# Patient Record
Sex: MALE | Race: WHITE | Employment: FULL TIME | URBAN - METROPOLITAN AREA
[De-identification: names, ages, dates, MRNs, and addresses within clinical notes are randomized per-mention and may not be internally consistent; named-entity substitution may affect disease eponyms.]

---

## 2017-05-18 ENCOUNTER — APPOINTMENT (OUTPATIENT)
Dept: CT IMAGING | Facility: CLINIC | Age: 57
End: 2017-05-18
Attending: EMERGENCY MEDICINE
Payer: COMMERCIAL

## 2017-05-18 ENCOUNTER — APPOINTMENT (OUTPATIENT)
Dept: ULTRASOUND IMAGING | Facility: CLINIC | Age: 57
End: 2017-05-18
Attending: EMERGENCY MEDICINE
Payer: COMMERCIAL

## 2017-05-18 ENCOUNTER — HOSPITAL ENCOUNTER (EMERGENCY)
Facility: CLINIC | Age: 57
Discharge: HOME | End: 2017-05-18
Attending: EMERGENCY MEDICINE | Admitting: EMERGENCY MEDICINE
Payer: COMMERCIAL

## 2017-05-18 VITALS
SYSTOLIC BLOOD PRESSURE: 148 MMHG | TEMPERATURE: 97.8 F | DIASTOLIC BLOOD PRESSURE: 99 MMHG | RESPIRATION RATE: 16 BRPM | OXYGEN SATURATION: 97 % | HEART RATE: 102 BPM

## 2017-05-18 DIAGNOSIS — E87.1 HYPONATREMIA: ICD-10-CM

## 2017-05-18 DIAGNOSIS — K70.10 ALCOHOLIC HEPATITIS WITHOUT ASCITES (H): ICD-10-CM

## 2017-05-18 DIAGNOSIS — R17 JAUNDICE: ICD-10-CM

## 2017-05-18 LAB
ALBUMIN SERPL-MCNC: 3 G/DL (ref 3.4–5)
ALBUMIN UR-MCNC: NEGATIVE MG/DL
ALP SERPL-CCNC: 567 U/L (ref 40–150)
ALT SERPL W P-5'-P-CCNC: 235 U/L (ref 0–70)
AMMONIA PLAS-SCNC: <10 UMOL/L (ref 10–50)
ANION GAP SERPL CALCULATED.3IONS-SCNC: 9 MMOL/L (ref 3–14)
APAP SERPL-MCNC: NORMAL MG/L (ref 10–20)
APPEARANCE UR: ABNORMAL
APTT PPP: 30 SEC (ref 22–37)
AST SERPL W P-5'-P-CCNC: 515 U/L (ref 0–45)
BASOPHILS # BLD AUTO: 0.1 10E9/L (ref 0–0.2)
BASOPHILS NFR BLD AUTO: 1.2 %
BILIRUB SERPL-MCNC: 9.1 MG/DL (ref 0.2–1.3)
BILIRUB UR QL STRIP: ABNORMAL
BUN SERPL-MCNC: 8 MG/DL (ref 7–30)
CALCIUM SERPL-MCNC: 9.3 MG/DL (ref 8.5–10.1)
CHLORIDE SERPL-SCNC: 91 MMOL/L (ref 94–109)
CO2 SERPL-SCNC: 28 MMOL/L (ref 20–32)
COLOR UR AUTO: ABNORMAL
CREAT SERPL-MCNC: 0.75 MG/DL (ref 0.66–1.25)
DIFFERENTIAL METHOD BLD: ABNORMAL
EOSINOPHIL # BLD AUTO: 0 10E9/L (ref 0–0.7)
EOSINOPHIL NFR BLD AUTO: 0.2 %
ERYTHROCYTE [DISTWIDTH] IN BLOOD BY AUTOMATED COUNT: 17.1 % (ref 10–15)
ETHANOL SERPL-MCNC: <0.01 G/DL
GFR SERPL CREATININE-BSD FRML MDRD: ABNORMAL ML/MIN/1.7M2
GLUCOSE SERPL-MCNC: 184 MG/DL (ref 70–99)
GLUCOSE UR STRIP-MCNC: NEGATIVE MG/DL
HCT VFR BLD AUTO: 39.9 % (ref 40–53)
HGB BLD-MCNC: 14 G/DL (ref 13.3–17.7)
HGB UR QL STRIP: NEGATIVE
IMM GRANULOCYTES # BLD: 0.1 10E9/L (ref 0–0.4)
IMM GRANULOCYTES NFR BLD: 0.9 %
INR PPP: 1.02 (ref 0.86–1.14)
KETONES UR STRIP-MCNC: NEGATIVE MG/DL
LEUKOCYTE ESTERASE UR QL STRIP: NEGATIVE
LIPASE SERPL-CCNC: 210 U/L (ref 73–393)
LYMPHOCYTES # BLD AUTO: 1.2 10E9/L (ref 0.8–5.3)
LYMPHOCYTES NFR BLD AUTO: 20.7 %
MCH RBC QN AUTO: 35.2 PG (ref 26.5–33)
MCHC RBC AUTO-ENTMCNC: 35.1 G/DL (ref 31.5–36.5)
MCV RBC AUTO: 100 FL (ref 78–100)
MONOCYTES # BLD AUTO: 0.7 10E9/L (ref 0–1.3)
MONOCYTES NFR BLD AUTO: 12.4 %
MUCOUS THREADS #/AREA URNS LPF: PRESENT /LPF
NEUTROPHILS # BLD AUTO: 3.6 10E9/L (ref 1.6–8.3)
NEUTROPHILS NFR BLD AUTO: 64.6 %
NITRATE UR QL: NEGATIVE
NRBC # BLD AUTO: 0 10*3/UL
NRBC BLD AUTO-RTO: 0 /100
PH UR STRIP: 7 PH (ref 5–7)
PLATELET # BLD AUTO: 157 10E9/L (ref 150–450)
POTASSIUM SERPL-SCNC: 4.1 MMOL/L (ref 3.4–5.3)
PROT SERPL-MCNC: 7.3 G/DL (ref 6.8–8.8)
RBC # BLD AUTO: 3.98 10E12/L (ref 4.4–5.9)
RBC #/AREA URNS AUTO: <1 /HPF (ref 0–2)
SODIUM SERPL-SCNC: 128 MMOL/L (ref 133–144)
SP GR UR STRIP: 1.03 (ref 1–1.03)
SQUAMOUS #/AREA URNS AUTO: <1 /HPF (ref 0–1)
URN SPEC COLLECT METH UR: ABNORMAL
UROBILINOGEN UR STRIP-MCNC: 0 MG/DL (ref 0–2)
WBC # BLD AUTO: 5.6 10E9/L (ref 4–11)
WBC #/AREA URNS AUTO: 2 /HPF (ref 0–2)

## 2017-05-18 PROCEDURE — 25000128 H RX IP 250 OP 636: Performed by: EMERGENCY MEDICINE

## 2017-05-18 PROCEDURE — 74177 CT ABD & PELVIS W/CONTRAST: CPT

## 2017-05-18 PROCEDURE — 83690 ASSAY OF LIPASE: CPT | Performed by: EMERGENCY MEDICINE

## 2017-05-18 PROCEDURE — 76705 ECHO EXAM OF ABDOMEN: CPT

## 2017-05-18 PROCEDURE — 81001 URINALYSIS AUTO W/SCOPE: CPT | Performed by: EMERGENCY MEDICINE

## 2017-05-18 PROCEDURE — 85610 PROTHROMBIN TIME: CPT | Performed by: EMERGENCY MEDICINE

## 2017-05-18 PROCEDURE — 36415 COLL VENOUS BLD VENIPUNCTURE: CPT | Performed by: EMERGENCY MEDICINE

## 2017-05-18 PROCEDURE — 80053 COMPREHEN METABOLIC PANEL: CPT | Performed by: EMERGENCY MEDICINE

## 2017-05-18 PROCEDURE — 80320 DRUG SCREEN QUANTALCOHOLS: CPT | Performed by: EMERGENCY MEDICINE

## 2017-05-18 PROCEDURE — 80329 ANALGESICS NON-OPIOID 1 OR 2: CPT | Performed by: EMERGENCY MEDICINE

## 2017-05-18 PROCEDURE — 99285 EMERGENCY DEPT VISIT HI MDM: CPT | Mod: 25

## 2017-05-18 PROCEDURE — 82140 ASSAY OF AMMONIA: CPT | Performed by: EMERGENCY MEDICINE

## 2017-05-18 PROCEDURE — 85025 COMPLETE CBC W/AUTO DIFF WBC: CPT | Performed by: EMERGENCY MEDICINE

## 2017-05-18 PROCEDURE — 85730 THROMBOPLASTIN TIME PARTIAL: CPT | Performed by: EMERGENCY MEDICINE

## 2017-05-18 RX ORDER — LIDOCAINE 40 MG/G
CREAM TOPICAL
Status: DISCONTINUED | OUTPATIENT
Start: 2017-05-18 | End: 2017-05-18 | Stop reason: HOSPADM

## 2017-05-18 RX ORDER — IOPAMIDOL 755 MG/ML
500 INJECTION, SOLUTION INTRAVASCULAR ONCE
Status: COMPLETED | OUTPATIENT
Start: 2017-05-18 | End: 2017-05-18

## 2017-05-18 RX ADMIN — IOPAMIDOL 88 ML: 755 INJECTION, SOLUTION INTRAVENOUS at 17:34

## 2017-05-18 RX ADMIN — SODIUM CHLORIDE 62 ML: 9 INJECTION, SOLUTION INTRAVENOUS at 17:34

## 2017-05-18 ASSESSMENT — ENCOUNTER SYMPTOMS: ABDOMINAL PAIN: 0

## 2017-05-18 NOTE — ED AVS SNAPSHOT
[facility] Emergency Department    [address]    [facility] [address]    Phone:  [phone]    Fax:  [phone]                                       Jony Najera   MRN: [id number]    Department:  [facility] Emergency Department   Date of Visit:  5/18/2017           Patient Information     Date Of Birth          1960        Your diagnoses for this visit were:     Jaundice     Alcoholic hepatitis without ascites     Hyponatremia        You were seen by Ruel Lewis MD.      Follow-up Information     Follow up with Anderson Rodriguez MD. Schedule an appointment as soon as possible for a visit in 1 day.    Specialty:  Family Practice    Why:  to get referral to see Gastroenterology as an outpatient    Contact information:    [facility]  [address] [facility] [id number] [phone]          Discharge Instructions       Cirrhosis    The liver is found on the right side of your abdomen, just below the rib cage. The liver has many essential functions. Among these, it filters toxins from the blood. It also helps blood clot to stop bleeding. Cirrhosis results from scarring and injury to the liver. This damage is permanent. It can lead to loss of liver function. At some point, the liver may stop working (liver failure).   Long-term heavy alcohol use and having hepatitis B or C are the two most common causes of cirrhosis. Other things that can damage the liver include toxins, certain medicines, and certain viruses.  Common symptoms of cirrhosis include:    Tiredness, weakness    Loss of appetite    Nausea and vomiting    Easy bleeding and bruising    Abdominal swelling    Weight loss    Jaundice    Itching    Confusion  Treatment is aimed at managing symptoms and preventing further liver damage. Treatments may be given to fight the hepatitis virus. Quitting alcohol will help slow the progress of the disease and may prevent further  complications. If cirrhosis progresses and becomes life threatening, a liver transplant may be an option in some cases.   Home care    Avoid medicines that can worsen liver damage.  Your healthcare provider will explain if any of the medicines you now take need to be changed. Talk to you healthcare provider before taking any medicine, including mineral and vitamin supplements or herbs. Certain substances can worsen liver damage.    Talk to your healthcare provider avoiding medicines containing acetaminophen or NSAID s (such as ibuprofen and naproxen). These can affect your liver.     Stop drinking alcohol. If you are dependent on alcohol or find it hard to stop drinking, seek professional help. Consider joining Alcoholics Anonymous or another type of treatment program for support.    If you use IV drugs, you are at high risk for hepatitis B and C. Seek help to stop.   Follow-up care  Follow up with your healthcare provider or as advised by our staff.  For more information and to learn about support groups for people with liver disease, contact:    [employer]  www.liverfoundation.org  658.384.8744    [employer]  www.hepfi.org  858- [phone]  When to seek medical advice  Call your healthcare provider for any of the following:    Rapid weight gain with increased size of your abdomen or leg swelling    Increasing jaundice (yellow color of skin or eyes)    Excess bleeding from cuts or injuries    5681-2862 [employer]. [address] [place] [address], [place] [address]. All rights reserved. This information is not intended as a substitute for professional medical care. Always follow your healthcare professional's instructions.             Discharge References/Attachments     CIRRHOSIS (ENGLISH)      24 Hour Appointment Hotline       To make an appointment at any [facility] clinic, call [phone]). If you don't have a family doctor or clinic, we will help you  find one. [facility] are conveniently located to serve the needs of you and your family.             Review of your medicines      Notice     You have not been prescribed any medications.            Procedures and tests performed during your visit     Abdomen US, limited (RUQ only)    Acetaminophen level    Alcohol ethyl    Ammonia    CBC + differential    CT Abdomen Pelvis w Contrast    Comprehensive metabolic panel    INR    Lipase    Partial thromboplastin time    Peripheral IV catheter    Pulse oximetry nursing    UA with Microscopic    Vital signs      Orders Needing Specimen Collection     None      Pending Results     Date and Time Order Name Status Description    5/18/2017 1716 Abdomen US, limited (RUQ only) Preliminary     5/18/2017 1716 CT Abdomen Pelvis w Contrast Preliminary             Pending Culture Results     No orders found from 5/16/2017 to 5/19/2017.            Pending Results Instructions     If you had any lab results that were not finalized at the time of your Discharge, you can call the ED Lab Result RN at [phone]. You will be contacted by this team for any positive Lab results or changes in treatment. The nurses are available 7 days a week from 10A to 6:30P.  You can leave a message 24 hours per day and they will return your call.        Test Results From Your Hospital Stay        5/18/2017  5:03 PM      Component Results     Component Value Ref Range & Units Status    Sodium 128 (L) 133 - 144 mmol/L Final    Potassium 4.1 3.4 - 5.3 mmol/L Final    Specimen moderately hemolyzed, Potassium may be falsely elevated    Chloride 91 (L) 94 - 109 mmol/L Final    Carbon Dioxide 28 20 - 32 mmol/L Final    Anion Gap 9 3 - 14 mmol/L Final    Glucose 184 (H) 70 - 99 mg/dL Final    Urea Nitrogen 8 7 - 30 mg/dL Final    Creatinine 0.75 0.66 - 1.25 mg/dL Final    GFR Estimate >90  Non  GFR Calc   >60 mL/min/1.7m2 Final    GFR Estimate If Black >90   GFR Calc   >60  mL/min/1.7m2 Final    Calcium 9.3 8.5 - 10.1 mg/dL Final    Bilirubin Total 9.1 (H) 0.2 - 1.3 mg/dL Final    Albumin 3.0 (L) 3.4 - 5.0 g/dL Final    Protein Total 7.3 6.8 - 8.8 g/dL Final    Alkaline Phosphatase 567 (H) 40 - 150 U/L Final     (H) 0 - 70 U/L Final     (HH) 0 - 45 U/L Final    Specimen hemolyzed   Critical Value called to and read back by  YURY HAM ([facility]) ON 05.18.17 AT 1703 BY VL           5/18/2017  4:41 PM      Component Results     Component Value Ref Range & Units Status    WBC 5.6 4.0 - 11.0 10e9/L Final    RBC Count 3.98 (L) 4.4 - 5.9 10e12/L Final    Hemoglobin 14.0 13.3 - 17.7 g/dL Final    Hematocrit 39.9 (L) 40.0 - 53.0 % Final     78 - 100 fl Final    MCH 35.2 (H) 26.5 - 33.0 pg Final    MCHC 35.1 31.5 - 36.5 g/dL Final    RDW 17.1 (H) 10.0 - 15.0 % Final    Platelet Count 157 150 - 450 10e9/L Final    Diff Method Automated Method  Final    % Neutrophils 64.6 % Final    % Lymphocytes 20.7 % Final    % Monocytes 12.4 % Final    % Eosinophils 0.2 % Final    % Basophils 1.2 % Final    % Immature Granulocytes 0.9 % Final    Nucleated RBCs 0 0 /100 Final    Absolute Neutrophil 3.6 1.6 - 8.3 10e9/L Final    Absolute Lymphocytes 1.2 0.8 - 5.3 10e9/L Final    Absolute Monocytes 0.7 0.0 - 1.3 10e9/L Final    Absolute Eosinophils 0.0 0.0 - 0.7 10e9/L Final    Absolute Basophils 0.1 0.0 - 0.2 10e9/L Final    Abs Immature Granulocytes 0.1 0 - 0.4 10e9/L Final    Absolute Nucleated RBC 0.0  Final         5/18/2017  5:46 PM      Component Results     Component Value Ref Range & Units Status    Ammonia <10 (L) 10 - 50 umol/L Final         5/18/2017  7:31 PM      Component Results     Component Value Ref Range & Units Status    Color Urine Hillary  Final    Appearance Urine Slightly Cloudy  Final    Glucose Urine Negative NEG mg/dL Final    Bilirubin Urine  NEG Final    Small   This is an unconfirmed screening test result. A positive result may be false.   (A)    Ketones Urine  Negative NEG mg/dL Final    Specific Gravity Urine 1.030 1.003 - 1.035 Final    Blood Urine Negative NEG Final    pH Urine 7.0 5.0 - 7.0 pH Final    Protein Albumin Urine Negative NEG mg/dL Final    Urobilinogen mg/dL 0.0 0.0 - 2.0 mg/dL Final    Nitrite Urine Negative NEG Final    Leukocyte Esterase Urine Negative NEG Final    Source Midstream Urine  Final    WBC Urine 2 0 - 2 /HPF Final    RBC Urine <1 0 - 2 /HPF Final    Squamous Epithelial /HPF Urine <1 0 - 1 /HPF Final    Mucous Urine Present (A) NEG /LPF Final         5/18/2017  6:28 PM      Narrative     CT ABDOMEN AND PELVIS WITH CONTRAST   5/18/2017 6:19 PM      HISTORY: Jaundice.    TECHNIQUE: CT abdomen and pelvis with intravenous contrast. Radiation  dose for this scan was reduced using automated exposure control,  adjustment of the mA and/or kV according to patient size, or iterative  reconstruction technique. 88mL [id number]-[id number].    COMPARISON: None.    FINDINGS:  Abdomen: The lung bases are unremarkable. The heart size is normal.  There is marked fatty infiltration of the liver. This is  geographically worse in the posterior liver than anterior liver. There  is a 2.5 cm indeterminant lesion in the right lobe of the liver  laterally. No other focal liver lesion. The portal vein is patent. The  gallbladder is distended and appears mildly thick-walled. No calcified  gallstones. No surrounding inflammation. The spleen, pancreas, adrenal  glands and kidneys are normal in appearance. There are multiple  borderline and mildly enlarged upper abdominal and retroperitoneal  lymph nodes. A left periaortic node on image 39 measures 2.5 x 1.1 cm.    Pelvis: There is no bowel obstruction or inflammation. The appendix is  normal. There is a small amount of free fluid in the pelvis. No free  intraperitoneal gas. There is degenerative disease in the spine.        Impression     IMPRESSION:  1. Hepatomegaly and marked fatty infiltration of the liver.  2. There is a 2.5 cm  indeterminate right lobe liver lesion.  3. Several borderline and mildly enlarged upper abdominal and  retroperitoneal lymph nodes of uncertain etiology.  4. The gallbladder is distended and the wall appears mildly thickened.  No calcified gallstones. No surrounding inflammation.   5. Small amount of free fluid in the pelvis.         5/18/2017  7:15 PM      Narrative     US ABDOMEN LIMITED  5/18/2017 7:08 PM     HISTORY:  jaundice, eval for obstruction    COMPARISON: None.    FINDINGS:   Gallbladder: Gallbladder wall is mildly thickened at 0.5 cm. No  gallstones are seen. The patient is not focally tender over the  gallbladder. There is sludge within the gallbladder.    Bile ducts:  CHD is normal diameter.  No intrahepatic biliary  dilatation.    Liver:  Liver is echogenic consistent with fatty infiltration. The  focal lesion seen in the right lobe of the liver on the recent CT is  not identified on this ultrasound study.    Pancreas:  Completely obscured by gas.    Right kidney:  Normal.         Impression     IMPRESSION:    1. The wall of the gallbladder is slightly thickened but no gallstones  are seen and the patient is not tender over the gallbladder. There is  sludge within the gallbladder.  2. Echogenic liver consistent with fatty infiltration. The focal  lesion seen on the CT in the right lobe of the liver is not identified  on this ultrasound exam.         5/18/2017  6:27 PM      Component Results     Component Value Ref Range & Units Status    Acetaminophen Level <2  Therapeutic range: 10-20 mg/L   mg/L Final         5/18/2017  5:42 PM      Component Results     Component Value Ref Range & Units Status    Ethanol g/dL <0.01 <0.01 g/dL Final         5/18/2017  5:37 PM      Component Results     Component Value Ref Range & Units Status    INR 1.02 0.86 - 1.14 Final         5/18/2017  5:28 PM      Component Results     Component Value Ref Range & Units Status    Lipase 210 73 - 393 U/L Final         5/18/2017   5:37 PM      Component Results     Component Value Ref Range & Units Status    PTT 30 22 - 37 sec Final                Clinical Quality Measure: Blood Pressure Screening     Your blood pressure was checked while you were in the emergency department today. The last reading we obtained was  BP: (!) 145/93 . Please read the guidelines below about what these numbers mean and what you should do about them.  If your systolic blood pressure (the top number) is less than 120 and your diastolic blood pressure (the bottom number) is less than 80, then your blood pressure is normal. There is nothing more that you need to do about it.  If your systolic blood pressure (the top number) is 120-139 or your diastolic blood pressure (the bottom number) is 80-89, your blood pressure may be higher than it should be. You should have your blood pressure rechecked within a year by a primary care provider.  If your systolic blood pressure (the top number) is 140 or greater or your diastolic blood pressure (the bottom number) is 90 or greater, you may have high blood pressure. High blood pressure is treatable, but if left untreated over time it can put you at risk for heart attack, stroke, or kidney failure. You should have your blood pressure rechecked by a primary care provider within the next 4 weeks.  If your provider in the emergency department today gave you specific instructions to follow-up with your doctor or provider even sooner than that, you should follow that instruction and not wait for up to 4 weeks for your follow-up visit.        Thank you for choosing [facility]       Thank you for choosing [facility] for your care. Our goal is always to provide you with excellent care. Hearing back from our patients is one way we can continue to improve our services. Please take a few minutes to complete the written survey that you may receive in the mail after you visit with us. Thank you!        [employer] Information     [facility] gives you  secure access to your electronic health record. If you see a primary care provider, you can also send messages to your care team and make appointments. If you have questions, please call your primary care clinic.  If you do not have a primary care provider, please call [phone] and they will assist you.        Care EveryWhere ID     This is your Care EveryWhere ID. This could be used by other organizations to access your [facility] medical records  [id number]        After Visit Summary       This is your record. Keep this with you and show to your community pharmacist(s) and doctor(s) at your next visit.

## 2017-05-18 NOTE — ED PROVIDER NOTES
History     Chief Complaint:  Jaundice      HPI   Jony Najera is a 56 year old male, with a history of anxiety, who presents with jaundice. The patient reports that he had a physical exam with his PCP in February 2017. Labs were normal at the time and patient was started on Lexapro for anxiety. The patient noted decreased energy and a lack of appetite. He had a fainting spell in early March and fell, hitting his left side on a bathtub and breaking three ribs. He denies any other episodes of LOC after that. He took Percocet for a couple of days and then stopped taking pain medication. The patient was then seen in late April by his PCP and his Lexapro was doubled. After 4-5 days, the patient was having great difficulties thinking and functioning and therefore stopped taking the Lexapro roughly 2 weeks ago. Since then, the patient's energy and concentration has returned. However, he and his family noticed yellowing of his eyes and face around the time he stopped taking the medication. The patient reports that he drinks 1-1.5 bottles of wine per day. He notes a roughly 10 lb weight loss over the past 2 months. On evaluation, the patient only notes continued pain in his left rib cage. He denies any other pain including abdominal pain. He reports continued anxiety.    Allergies:  NKDA    Medications:    The patient is currently on no regular medications.       Past Medical History:    Esophageal reflux  Anxiety    Past Surgical History:    Testicular torsion surgery    Family History:    Brother: pancreatic cancer, age 57    Social History:  Marital status:   Never smoker, positive for alcohol use (1-1.5 bottles of wine per day).  The patient presents alone.    Review of Systems   Constitutional:        Positive for jaundice.   Gastrointestinal: Negative for abdominal pain.   All other systems reviewed and are negative.    Physical Exam   First Vitals:  BP: (!) 165/113  Pulse: 118  Temp: 97.8  F (36.6  C)  Resp:  16  SpO2: 99 %      Physical Exam  General: The patient is alert, in no respiratory distress.    HENT: Scleral icterus. Mucous membranes moist.    Cardiovascular: Regular rate and rhythm. Good pulses in all four extremities. Normal capillary refill and skin turgor.     Respiratory: Lungs are clear. No nasal flaring. No retractions. No wheezing, no crackles.    Gastrointestinal: Abdomen soft. No guarding, no rebound. No palpable hernias.     Musculoskeletal: No gross deformity.     Skin: Jaundice of face. No rashes or petechiae.     Neurologic: Mild tremor, but appears anxious. The patient is alert and oriented x3. GCS 15. No testable cranial nerve deficit. Follows commands with clear and appropriate speech. Gives appropriate answers. Good strength in all extremities. No gross neurologic deficit. Gross sensation intact. Pupils are round and reactive. No meningismus.     Lymphatic: No cervical adenopathy. No lower extremity swelling.    Psychiatric: Anxious. The patient is non-tearful.     Emergency Department Course     Imaging:  Radiographic findings were communicated with the patient who voiced understanding of the findings.    CT Abdomen and Pelvis, with contrast, as per radiology:   1. Hepatomegaly and marked fatty infiltration of the liver.  2. There is a 2.5 cm indeterminate right lobe liver lesion.  3. Several borderline and mildly enlarged upper abdominal and retroperitoneal lymph nodes of uncertain etiology.  4. The gallbladder is distended and the wall appears mildly thickened. No calcified gallstones. No surrounding inflammation.   5. Small amount of free fluid in the pelvis.    Limited (RUQ) Abdomen US per radiology:   1. The wall of the gallbladder is slightly thickened but no gallstones are seen and the patient is not tender over the gallbladder. There is sludge within the gallbladder.  2. Echogenic liver consistent with fatty infiltration. The focal lesion seen on the CT in the right lobe of the liver  is not identified on this ultrasound exam.     Laboratory:  CBC: WBC 5.6 (WNL) HGB 14 (WNL)  (WNL) RBC 3.98 (L) HCT 39.9 (L) MCH 35.2 (H) RDW 17.1 (H)  CMP: Creatinine 0.75 (WNL) Glucose 184 (H)  (L) Chloride 91 (L) Bilirubin 9.1 (H) Albumin 3.0 (L) Alkphos 567 (H)  (H)  (HH) Rest WNL  Ammonia: <10 (L)   PTT: 30 (WNL)  Lipase: 210 (WNL)  INR 1.02 (WNL)  Alcohol ethyl: <0.01 (WNL)  Acetaminophen: <2 (WNL)    UA: Slightly cloudy, tristin urine; Nebilin Small (A) Mucous Present (A) Rest WNL    Interventions:  1734: Normal Saline, 1000 mL, IV injection     ED Course:  Nursing notes and vitals reviewed.  I performed an exam of the patient as documented above.     1919: I spoke to Dr. Briggs of GI about the patient.   2010: I checked in with and updated the patient on the findings. We discussed the plan. He is ready for discharge.    I personally reviewed the laboratory and imaging results with the patient and answered all related questions prior to discharge.   Findings and plan explained to the patient. Patient discharged home with instructions regarding supportive care, medications, and reasons to return. The importance of close follow-up was reviewed.     Impression & Plan      Medical Decision Making:  Jony Najera is a 56 year old male who presented complaining of jaundice. He has no abdominal pain nor tenderness. He does however have a family history of pancreatic cancer, therefore I did consider that he could have a pancreatic mass and on his CT scan, they did report that there was an unusual area however it was not visualized on ultrasound which, if there as a mass, it would definitely show up. The patient otherwise is stable here. I did check for obstructive causes with the ultrasound and, while they do see sludge, there are no signs of other processes. The gallbladder wall looks thickened but he has no focal tenderness in that area. I discussed the case with GI and reviewed the labs.  They felt this was likely secondary to his heavy alcohol use. He drinks more than a bottle of red wine a day. His sodium is slightly low but that is likely due to the edema which the wife whom I talked to reported has been getting better. I felt that he is appropriate for outpatient discharge. GI did want to see him as an outpatient. I will have him see his primary care doctor for that referral. He is instructed to return if he develops abdominal pain, bloody stools, vomiting, or other symptoms. Otherwise, he was stable. He was observed for a long period here in the ER and was discharged in good condition.     Diagnosis:    ICD-10-CM    1. Jaundice R17    2. Alcoholic hepatitis without ascites K70.10    3. Hyponatremia E87.1      Disposition:   Discharge to home with primary care follow up.     I, Nissa Shashimargaritaon, am serving as a scribe on 5/18/2017 at 5:52 PM to personally document services performed by Ruel Lewis MD, based on my observations and the provider's statements to me.           Ruel Lewis MD  05/18/17 4298

## 2017-05-18 NOTE — ED NOTES
Pt states that he has been jaundiced for 2 weeks. Pt fainted in March and broke 3 ribs. Pt states that he drinks 5-6 glasses of wine daily. ABCDs intact. Denies abdominal pain. Patient states that he has been vomiting in the morning.

## 2017-05-18 NOTE — ED AVS SNAPSHOT
[facility] Emergency Department    [address]    [facility] [address]    Phone:  [phone]    Fax:  [phone]                                       Jony Najera   MRN: [id number]    Department:  [facility] Emergency Department   Date of Visit:  5/18/2017           After Visit Summary Signature Page     I have received my discharge instructions, and my questions have been answered. I have discussed any challenges I see with this plan with the nurse or doctor.    ..........................................................................................................................................  Patient/Patient Representative Signature      ..........................................................................................................................................  Patient Representative Print Name and Relationship to Patient    ..................................................               ................................................  Date                                            Time    ..........................................................................................................................................  Reviewed by Signature/Title    ...................................................              ..............................................  Date                                                            Time

## 2017-05-19 NOTE — DISCHARGE INSTRUCTIONS
Cirrhosis    The liver is found on the right side of your abdomen, just below the rib cage. The liver has many essential functions. Among these, it filters toxins from the blood. It also helps blood clot to stop bleeding. Cirrhosis results from scarring and injury to the liver. This damage is permanent. It can lead to loss of liver function. At some point, the liver may stop working (liver failure).   Long-term heavy alcohol use and having hepatitis B or C are the two most common causes of cirrhosis. Other things that can damage the liver include toxins, certain medicines, and certain viruses.  Common symptoms of cirrhosis include:    Tiredness, weakness    Loss of appetite    Nausea and vomiting    Easy bleeding and bruising    Abdominal swelling    Weight loss    Jaundice    Itching    Confusion  Treatment is aimed at managing symptoms and preventing further liver damage. Treatments may be given to fight the hepatitis virus. Quitting alcohol will help slow the progress of the disease and may prevent further complications. If cirrhosis progresses and becomes life threatening, a liver transplant may be an option in some cases.   Home care    Avoid medicines that can worsen liver damage.  Your healthcare provider will explain if any of the medicines you now take need to be changed. Talk to you healthcare provider before taking any medicine, including mineral and vitamin supplements or herbs. Certain substances can worsen liver damage.    Talk to your healthcare provider avoiding medicines containing acetaminophen or NSAID s (such as ibuprofen and naproxen). These can affect your liver.     Stop drinking alcohol. If you are dependent on alcohol or find it hard to stop drinking, seek professional help. Consider joining Alcoholics Anonymous or another type of treatment program for support.    If you use IV drugs, you are at high risk for hepatitis B and C. Seek help to stop.   Follow-up care  Follow up with your  healthcare provider or as advised by our staff.  For more information and to learn about support groups for people with liver disease, contact:    [employer]  www.liverfoundation.org  718.158.9430    [employer]  www.hepfi.org  375- 021-9909  When to seek medical advice  Call your healthcare provider for any of the following:    Rapid weight gain with increased size of your abdomen or leg swelling    Increasing jaundice (yellow color of skin or eyes)    Excess bleeding from cuts or injuries    0932-2063 [employer]. [address] [place], [place] [address]. All rights reserved. This information is not intended as a substitute for professional medical care. Always follow your healthcare professional's instructions.

## 2018-10-30 ENCOUNTER — HOSPITAL ENCOUNTER (EMERGENCY)
Facility: CLINIC | Age: 58
Discharge: HOME | End: 2018-10-30
Attending: EMERGENCY MEDICINE | Admitting: EMERGENCY MEDICINE
Payer: COMMERCIAL

## 2018-10-30 VITALS
TEMPERATURE: 98.7 F | OXYGEN SATURATION: 97 % | RESPIRATION RATE: 13 BRPM | DIASTOLIC BLOOD PRESSURE: 94 MMHG | SYSTOLIC BLOOD PRESSURE: 164 MMHG

## 2018-10-30 DIAGNOSIS — T50.901A ACCIDENTAL DRUG OVERDOSE, INITIAL ENCOUNTER: ICD-10-CM

## 2018-10-30 LAB
ANION GAP SERPL CALCULATED.3IONS-SCNC: 9 MMOL/L (ref 3–14)
BUN SERPL-MCNC: 14 MG/DL (ref 7–30)
CALCIUM SERPL-MCNC: 9.1 MG/DL (ref 8.5–10.1)
CHLORIDE SERPL-SCNC: 101 MMOL/L (ref 94–109)
CO2 SERPL-SCNC: 26 MMOL/L (ref 20–32)
CREAT SERPL-MCNC: 0.91 MG/DL (ref 0.66–1.25)
ERYTHROCYTE [DISTWIDTH] IN BLOOD BY AUTOMATED COUNT: 13.2 % (ref 10–15)
GFR SERPL CREATININE-BSD FRML MDRD: 85 ML/MIN/1.7M2
GLUCOSE SERPL-MCNC: 128 MG/DL (ref 70–99)
HCT VFR BLD AUTO: 43.1 % (ref 40–53)
HGB BLD-MCNC: 14.7 G/DL (ref 13.3–17.7)
INTERPRETATION ECG - MUSE: NORMAL
MCH RBC QN AUTO: 36.3 PG (ref 26.5–33)
MCHC RBC AUTO-ENTMCNC: 34.1 G/DL (ref 31.5–36.5)
MCV RBC AUTO: 106 FL (ref 78–100)
PLATELET # BLD AUTO: 182 10E9/L (ref 150–450)
POTASSIUM SERPL-SCNC: 3.9 MMOL/L (ref 3.4–5.3)
RBC # BLD AUTO: 4.05 10E12/L (ref 4.4–5.9)
SODIUM SERPL-SCNC: 136 MMOL/L (ref 133–144)
WBC # BLD AUTO: 6.2 10E9/L (ref 4–11)

## 2018-10-30 PROCEDURE — 99284 EMERGENCY DEPT VISIT MOD MDM: CPT

## 2018-10-30 PROCEDURE — 85027 COMPLETE CBC AUTOMATED: CPT | Performed by: EMERGENCY MEDICINE

## 2018-10-30 PROCEDURE — 93005 ELECTROCARDIOGRAM TRACING: CPT

## 2018-10-30 PROCEDURE — 80048 BASIC METABOLIC PNL TOTAL CA: CPT | Performed by: EMERGENCY MEDICINE

## 2018-10-30 RX ORDER — BUPROPION HYDROCHLORIDE 300 MG/1
300 TABLET ORAL
COMMUNITY
Start: 2018-06-26

## 2018-10-30 ASSESSMENT — ENCOUNTER SYMPTOMS
VOMITING: 0
TREMORS: 1
NERVOUS/ANXIOUS: 1
ABDOMINAL PAIN: 0
NAUSEA: 0

## 2018-10-30 NOTE — ED NOTES
"Patient presents with accidental overdose of his prescription Wellbutrin. Patient normally takes 300 mg, took the 300 mg of Wellbutrin at 0545, and then accidentally took another 300 mg at 0700, for a total of 600 mg. Patient called PCP, who called poison control, who told him to come here to be watched for seizures and EKG. Patient feels \"off\", hypertensive and photophobia. ABCDs intact, alert and oriented x 4.   "

## 2018-10-30 NOTE — ED AVS SNAPSHOT
[facility] Emergency Department    [address]    [facility] [address]    Phone:  [phone]    Fax:  [phone]                                       Jony Najera   MRN: [id number]    Department:  [facility] Emergency Department   Date of Visit:  10/30/2018           After Visit Summary Signature Page     I have received my discharge instructions, and my questions have been answered. I have discussed any challenges I see with this plan with the nurse or doctor.    ..........................................................................................................................................  Patient/Patient Representative Signature      ..........................................................................................................................................  Patient Representative Print Name and Relationship to Patient    ..................................................               ................................................  Date                                   Time    ..........................................................................................................................................  Reviewed by Signature/Title    ...................................................              ..............................................  Date                                               Time          22EPIC Rev 08/18

## 2018-10-30 NOTE — ED PROVIDER NOTES
History     Chief Complaint:  Ingestion evaluation    HPI   Jony Najera is a 57 year old male with a history of depression who presents for evaluation of ingestion. The patient states this morning around 0545 he took his prescribed 300 mg Wellbutrin.  He states he usually marks off when he takes it, but forgot to do this and accidentally took it again at 0700. Around 0745 the patient began to experience hypersensitivity to light, tremors, felt he was easily startled and found his blood pressure to be high. He otherwise feels well with no additional complaints. The patient denies any nausea, vomiting, or abdominal pain. He denies any coingestants. He has no history of hypertension in the past.     Allergies:  No known drug allergies     Medications:    Wellbutrin    Past Medical History:    Esophageal reflux  Depression    Past Surgical History:    Testicular torsion surgery    Family History:    Pancreatic cancer    Social History:  Smoking Status: Never Smoker  Alcohol Use: Some  Patient presents alone.  Marital Status:       Review of Systems   Eyes:        Positive for hypersensitivity to light.   Gastrointestinal: Negative for abdominal pain, nausea and vomiting.   Neurological: Positive for tremors.   Psychiatric/Behavioral: The patient is nervous/anxious.    All other systems reviewed and are negative.    Physical Exam     Patient Vitals for the past 24 hrs:   BP Temp Temp src Heart Rate Resp SpO2   10/30/18 1030 142/82 - - 94 21 96 %   10/30/18 1015 151/85 - - - - -   10/30/18 1000 145/89 - - - 19 96 %   10/30/18 0945 151/89 - - 108 20 95 %   10/30/18 0930 145/86 - - 96 21 94 %   10/30/18 0915 (!) 158/91 - - - - -   10/30/18 0900 164/88 - - 98 16 95 %   10/30/18 0845 (!) 162/103 - - 101 18 97 %   10/30/18 0843 (!) 163/98 98.7  F (37.1  C) Oral 102 24 96 %     Physical Exam    General:   Pleasant, age appropriate.      Resting comfortably in the bed.  HEENT:    Oropharynx is moist  Eyes:     Conjunctiva normal  Neck:    Supple, no meningismus.     CV:     Regular rate and rhythm.      No murmurs, rubs or gallops.       2+ radial pulses bilateral.   PULM:    Clear to auscultation bilateral.       No respiratory distress.      Good air exchange.  ABD:    Soft, non-tender, non-distended.       No rebound, guarding or rigidity.  MSK:     No gross deformity to all four extremities.   LYMPH:   No cervical lymphadenopathy.  NEURO:   Alert and oriented x 3.      Speech is clear with no aphasia.     Strength is 5/5 in all 4 extremities.  Sensation is intact.       Normal muscular tone     Mild resting tremor in upper extremities; no rigidity  Skin:    Warm, dry and intact.    Psych:    Mildly anxious    Emergency Department Course     ECG (8:48:28):  Rate 98 bpm. ID interval 138 ms. QRS duration 80 ms. QT/QTc 356/454 ms. P-R-T axes 53 17 51. Normal sinus rhythm.  Normal ECG.  No significant change compared to ECG dated 12/13/15.  Interpreted at 0854 by Daniel Rojas MD.    Laboratory:  CBC (platelets, no diff): RBC 4.05 (L),  (H), MCH 36.3 (H) o/w AWNL (WBC 6.2, HGB 14.7, )    CMP: Glucose 128 (H) o/w AWNL (Creatinine 0.91)    Emergency Department Course:  Past medical records, nursing notes, and vitals reviewed.  0836: I performed an exam of the patient and obtained history, as documented above.    ECG was obtained.    Blood was drawn.     0927: I discussed the patient with Poison Control.     Patient elected to leave Against Medical Advice.    Impression & Plan      Medical Decision Makin-year-old male who presented to the ED with unintentional overdose of Wellbutrin XL.  By the time he presented to the ED, he had a mild resting tremor and low-grade tachycardia.  Basic laboratory studies are unrevealing.  I consulted with poison control who recommended 24-hour observation for seizures given the extended release formulation and risk of delayed onset of seizures.  I recommended the  patient be admitted but he refused.  I informed him that leaving AGAINST MEDICAL ADVICE puts him at significant risk due to the possibility of seizures or cardiac dysrhythmia which could lead to anoxic brain injury, permanent neurologic disability, cardiac arrest or even death.  Patient has capacity to make his own medical decisions and he would like to leave AGAINST MEDICAL ADVICE.  He was comfortable with prolonged observation until 2 PM which is the time his son would be available to pick him up and monitor him at home.  Patient's tachycardia and tremor did resolve during this time of observation.  Patient will remain in the ED until his son can pick him up. At that point, he will leave AGAINST MEDICAL ADVICE.    Diagnosis:    ICD-10-CM   1. Accidental drug overdose, initial encounter T50.901A       Disposition:  BHARATH Brown  10/30/2018   [facility] EMERGENCY DEPARTMENT  I, Ayanna Brown, am serving as a scribe at 8:36 AM on 10/30/2018 to document services personally performed by Daniel Rojas MD based on my observations and the provider's statements to me.        Daniel Rojas MD  10/30/18 2332

## 2018-10-30 NOTE — DISCHARGE INSTRUCTIONS
I have recommended that you be admitted to the hospital overnight and observed for seizures.  The unintentional overdose of Wellbutrin can lead to delayed seizuresup to 24 hours after ingestion.  It is my understanding that you would like to leave the hospital today and not be admitted.  Please be aware that this puts you at risk for seizure and heart rhythm issues which could lead to stroke, permanent neurologic damage and even death.

## 2018-10-30 NOTE — ED AVS SNAPSHOT
[facility] Emergency Department    [address]    [facility] [address]    Phone:  [phone]    Fax:  [phone]                                       Jony Najera   MRN: [id number]    Department:  [facility] Emergency Department   Date of Visit:  10/30/2018           Patient Information     Date Of Birth          1960        Your diagnoses for this visit were:     Accidental drug overdose, initial encounter        You were seen by Daniel Rojas MD.      Follow-up Information     Follow up with [facility] Emergency Department.    Specialty:  EMERGENCY MEDICINE    Why:  As needed    Contact information:    [address] [facility] [address] [phone]        Discharge Instructions       I have recommended that you be admitted to the hospital overnight and observed for seizures.  The unintentional overdose of Wellbutrin can lead to delayed seizuresup to 24 hours after ingestion.  It is my understanding that you would like to leave the hospital today and not be admitted.  Please be aware that this puts you at risk for seizure and heart rhythm issues which could lead to stroke, permanent neurologic damage and even death.          Discharge References/Attachments     INGESTION, ACCIDENTAL: NONTOXIC (ADULT) (ENGLISH)      24 Hour Appointment Hotline       To make an appointment at any [facility] clinic, call [phone] ([phone]). If you don't have a family doctor or clinic, we will help you find one. [facility] clinics are conveniently located to serve the needs of you and your family.             Review of your medicines      Our records show that you are taking the medicines listed below. If these are incorrect, please call your family doctor or clinic.        Dose / Directions Last dose taken    buPROPion 300 MG 24 hr tablet   Commonly known as:  WELLBUTRIN XL   Dose:  300 mg        Take 300 mg by mouth   Refills:  0                 Procedures and tests performed during your visit     Basic metabolic panel (BMP)    CBC (platelets, no diff)    Cardiac Continuous Monitoring    EKG 12 lead    Peripheral IV catheter      Orders Needing Specimen Collection     None      Pending Results     No orders found from 10/28/2018 to 10/31/2018.            Pending Culture Results     No orders found from 10/28/2018 to 10/31/2018.            Pending Results Instructions     If you had any lab results that were not finalized at the time of your Discharge, you can call the ED Lab Result RN at [phone]. You will be contacted by this team for any positive Lab results or changes in treatment. The nurses are available 7 days a week from 10A to 6:30P.  You can leave a message 24 hours per day and they will return your call.        Test Results From Your Hospital Stay        10/30/2018  8:58 AM      Component Results     Component Value Ref Range & Units Status    WBC 6.2 4.0 - 11.0 10e9/L Final    RBC Count 4.05 (L) 4.4 - 5.9 10e12/L Final    Hemoglobin 14.7 13.3 - 17.7 g/dL Final    Hematocrit 43.1 40.0 - 53.0 % Final     (H) 78 - 100 fl Final    MCH 36.3 (H) 26.5 - 33.0 pg Final    MCHC 34.1 31.5 - 36.5 g/dL Final    RDW 13.2 10.0 - 15.0 % Final    Platelet Count 182 150 - 450 10e9/L Final         10/30/2018  9:20 AM      Component Results     Component Value Ref Range & Units Status    Sodium 136 133 - 144 mmol/L Final    Potassium 3.9 3.4 - 5.3 mmol/L Final    Chloride 101 94 - 109 mmol/L Final    Carbon Dioxide 26 20 - 32 mmol/L Final    Anion Gap 9 3 - 14 mmol/L Final    Glucose 128 (H) 70 - 99 mg/dL Final    Urea Nitrogen 14 7 - 30 mg/dL Final    Creatinine 0.91 0.66 - 1.25 mg/dL Final    GFR Estimate 85 >60 mL/min/1.7m2 Final    Non  GFR Calc    GFR Estimate If Black >90 >60 mL/min/1.7m2 Final    African American GFR Calc    Calcium 9.1 8.5 - 10.1 mg/dL Final                Clinical Quality Measure: Blood Pressure  Screening     Your blood pressure was checked while you were in the emergency department today. The last reading we obtained was  BP: 142/82 . Please read the guidelines below about what these numbers mean and what you should do about them.  If your systolic blood pressure (the top number) is less than 120 and your diastolic blood pressure (the bottom number) is less than 80, then your blood pressure is normal. There is nothing more that you need to do about it.  If your systolic blood pressure (the top number) is 120-139 or your diastolic blood pressure (the bottom number) is 80-89, your blood pressure may be higher than it should be. You should have your blood pressure rechecked within a year by a primary care provider.  If your systolic blood pressure (the top number) is 140 or greater or your diastolic blood pressure (the bottom number) is 90 or greater, you may have high blood pressure. High blood pressure is treatable, but if left untreated over time it can put you at risk for heart attack, stroke, or kidney failure. You should have your blood pressure rechecked by a primary care provider within the next 4 weeks.  If your provider in the emergency department today gave you specific instructions to follow-up with your doctor or provider even sooner than that, you should follow that instruction and not wait for up to 4 weeks for your follow-up visit.        Thank you for choosing [facility]       Thank you for choosing [facility] for your care. Our goal is always to provide you with excellent care. Hearing back from our patients is one way we can continue to improve our services. Please take a few minutes to complete the written survey that you may receive in the mail after you visit with us. Thank you!        [employer] Information     [employer] lets you send messages to your doctor, view your test results, renew your prescriptions, schedule appointments and more. To sign up, go to [email or web address] . Click on  "\"Log in\" on the left side of the screen, which will take you to the Welcome page. Then click on \"Sign up Now\" on the right side of the page.     You will be asked to enter the access code listed below, as well as some personal information. Please follow the directions to create your username and password.     Your access code is: [email or web address]  Expires: 2019  1:57 PM     Your access code will  in 90 days. If you need help or a new code, please call your [facility] clinic or [phone].        Care EveryWhere ID     This is your Care EveryWhere ID. This could be used by other organizations to access your [facility] medical records  [id number]        Equal Access to Services     JIGAR BOYKIN : Andrzej Olvera, wateetee carrillo, sandra menaalshanna rajan, baltazar cain. So [facility] [place] [phone].    ATENCIÓN: Si habla español, tiene a briscoe disposición servicios gratuitos de asistencia lingüística. Llame al [phone].    We comply with applicable federal civil rights laws and [place] laws. We do not discriminate on the basis of race, color, national origin, age, disability, sex, sexual orientation, or gender identity.            After Visit Summary       This is your record. Keep this with you and show to your community pharmacist(s) and doctor(s) at your next visit.                  "